# Patient Record
Sex: FEMALE | Race: BLACK OR AFRICAN AMERICAN | NOT HISPANIC OR LATINO | Employment: UNEMPLOYED | URBAN - METROPOLITAN AREA
[De-identification: names, ages, dates, MRNs, and addresses within clinical notes are randomized per-mention and may not be internally consistent; named-entity substitution may affect disease eponyms.]

---

## 2019-11-14 ENCOUNTER — HOSPITAL ENCOUNTER (OUTPATIENT)
Dept: RADIOLOGY | Facility: HOSPITAL | Age: 49
Discharge: HOME | End: 2019-11-14
Attending: STUDENT IN AN ORGANIZED HEALTH CARE EDUCATION/TRAINING PROGRAM
Payer: COMMERCIAL

## 2019-11-14 ENCOUNTER — OFFICE VISIT (OUTPATIENT)
Dept: RHEUMATOLOGY | Facility: CLINIC | Age: 49
End: 2019-11-14
Payer: COMMERCIAL

## 2019-11-14 ENCOUNTER — LAB VISIT (OUTPATIENT)
Dept: LAB | Facility: HOSPITAL | Age: 49
End: 2019-11-14
Attending: STUDENT IN AN ORGANIZED HEALTH CARE EDUCATION/TRAINING PROGRAM
Payer: COMMERCIAL

## 2019-11-14 VITALS — HEART RATE: 59 BPM | WEIGHT: 221.56 LBS | SYSTOLIC BLOOD PRESSURE: 127 MMHG | DIASTOLIC BLOOD PRESSURE: 83 MMHG

## 2019-11-14 DIAGNOSIS — M79.7 FIBROMYALGIA: ICD-10-CM

## 2019-11-14 DIAGNOSIS — M19.90 OSTEOARTHRITIS, UNSPECIFIED OSTEOARTHRITIS TYPE, UNSPECIFIED SITE: ICD-10-CM

## 2019-11-14 DIAGNOSIS — M25.50 ARTHRALGIA, UNSPECIFIED JOINT: ICD-10-CM

## 2019-11-14 DIAGNOSIS — R53.83 FATIGUE, UNSPECIFIED TYPE: ICD-10-CM

## 2019-11-14 DIAGNOSIS — M79.7 FIBROMYALGIA: Primary | ICD-10-CM

## 2019-11-14 LAB
25(OH)D3+25(OH)D2 SERPL-MCNC: 57 NG/ML (ref 30–96)
ALBUMIN SERPL BCP-MCNC: 4.1 G/DL (ref 3.5–5.2)
ALP SERPL-CCNC: 86 U/L (ref 55–135)
ALT SERPL W/O P-5'-P-CCNC: 8 U/L (ref 10–44)
ANION GAP SERPL CALC-SCNC: 11 MMOL/L (ref 8–16)
AST SERPL-CCNC: 14 U/L (ref 10–40)
BASOPHILS # BLD AUTO: 0.03 K/UL (ref 0–0.2)
BASOPHILS NFR BLD: 0.4 % (ref 0–1.9)
BILIRUB SERPL-MCNC: 0.9 MG/DL (ref 0.1–1)
BUN SERPL-MCNC: 8 MG/DL (ref 6–20)
CALCIUM SERPL-MCNC: 9.1 MG/DL (ref 8.7–10.5)
CCP AB SER IA-ACNC: <0.5 U/ML
CHLORIDE SERPL-SCNC: 105 MMOL/L (ref 95–110)
CO2 SERPL-SCNC: 25 MMOL/L (ref 23–29)
CREAT SERPL-MCNC: 0.8 MG/DL (ref 0.5–1.4)
CRP SERPL-MCNC: 22.4 MG/L (ref 0–8.2)
DIFFERENTIAL METHOD: ABNORMAL
EOSINOPHIL # BLD AUTO: 0 K/UL (ref 0–0.5)
EOSINOPHIL NFR BLD: 0.4 % (ref 0–8)
ERYTHROCYTE [DISTWIDTH] IN BLOOD BY AUTOMATED COUNT: 15 % (ref 11.5–14.5)
ERYTHROCYTE [SEDIMENTATION RATE] IN BLOOD BY WESTERGREN METHOD: 48 MM/HR (ref 0–36)
EST. GFR  (AFRICAN AMERICAN): >60 ML/MIN/1.73 M^2
EST. GFR  (NON AFRICAN AMERICAN): >60 ML/MIN/1.73 M^2
GLUCOSE SERPL-MCNC: 98 MG/DL (ref 70–110)
HCT VFR BLD AUTO: 37 % (ref 37–48.5)
HGB BLD-MCNC: 11.8 G/DL (ref 12–16)
IMM GRANULOCYTES # BLD AUTO: 0.02 K/UL (ref 0–0.04)
IMM GRANULOCYTES NFR BLD AUTO: 0.3 % (ref 0–0.5)
LYMPHOCYTES # BLD AUTO: 1.6 K/UL (ref 1–4.8)
LYMPHOCYTES NFR BLD: 22 % (ref 18–48)
MCH RBC QN AUTO: 27.9 PG (ref 27–31)
MCHC RBC AUTO-ENTMCNC: 31.9 G/DL (ref 32–36)
MCV RBC AUTO: 88 FL (ref 82–98)
MONOCYTES # BLD AUTO: 0.2 K/UL (ref 0.3–1)
MONOCYTES NFR BLD: 2.6 % (ref 4–15)
NEUTROPHILS # BLD AUTO: 5.3 K/UL (ref 1.8–7.7)
NEUTROPHILS NFR BLD: 74.3 % (ref 38–73)
NRBC BLD-RTO: 0 /100 WBC
PLATELET # BLD AUTO: 408 K/UL (ref 150–350)
PMV BLD AUTO: 11.3 FL (ref 9.2–12.9)
POTASSIUM SERPL-SCNC: 3.7 MMOL/L (ref 3.5–5.1)
PROT SERPL-MCNC: 8.2 G/DL (ref 6–8.4)
RBC # BLD AUTO: 4.23 M/UL (ref 4–5.4)
RHEUMATOID FACT SERPL-ACNC: <10 IU/ML (ref 0–15)
SODIUM SERPL-SCNC: 141 MMOL/L (ref 136–145)
WBC # BLD AUTO: 7.19 K/UL (ref 3.9–12.7)

## 2019-11-14 PROCEDURE — 73130 X-RAY EXAM OF HAND: CPT | Mod: 50,TC

## 2019-11-14 PROCEDURE — 85652 RBC SED RATE AUTOMATED: CPT

## 2019-11-14 PROCEDURE — 73130 X-RAY EXAM OF HAND: CPT | Mod: 26,LT,, | Performed by: RADIOLOGY

## 2019-11-14 PROCEDURE — 85025 COMPLETE CBC W/AUTO DIFF WBC: CPT

## 2019-11-14 PROCEDURE — 73130 X-RAY EXAM OF HAND: CPT | Mod: 26,RT,, | Performed by: RADIOLOGY

## 2019-11-14 PROCEDURE — 73562 X-RAY EXAM OF KNEE 3: CPT | Mod: 26,RT,, | Performed by: RADIOLOGY

## 2019-11-14 PROCEDURE — 86140 C-REACTIVE PROTEIN: CPT

## 2019-11-14 PROCEDURE — 73130 PR  X-RAY HAND 3+ VW: ICD-10-PCS | Mod: 26,LT,, | Performed by: RADIOLOGY

## 2019-11-14 PROCEDURE — 99999 PR PBB SHADOW E&M-NEW PATIENT-LVL IV: ICD-10-PCS | Mod: PBBFAC,,, | Performed by: STUDENT IN AN ORGANIZED HEALTH CARE EDUCATION/TRAINING PROGRAM

## 2019-11-14 PROCEDURE — 72200 XR SACROILIAC JOINTS 3 VIEWS: ICD-10-PCS | Mod: 26,,, | Performed by: RADIOLOGY

## 2019-11-14 PROCEDURE — 86038 ANTINUCLEAR ANTIBODIES: CPT

## 2019-11-14 PROCEDURE — 36415 COLL VENOUS BLD VENIPUNCTURE: CPT

## 2019-11-14 PROCEDURE — 72110 X-RAY EXAM L-2 SPINE 4/>VWS: CPT | Mod: TC

## 2019-11-14 PROCEDURE — 80053 COMPREHEN METABOLIC PANEL: CPT

## 2019-11-14 PROCEDURE — 82306 VITAMIN D 25 HYDROXY: CPT

## 2019-11-14 PROCEDURE — 73562 PR  X-RAY KNEE 3 VIEW: ICD-10-PCS | Mod: 26,LT,, | Performed by: RADIOLOGY

## 2019-11-14 PROCEDURE — 99204 OFFICE O/P NEW MOD 45 MIN: CPT | Mod: 25,S$GLB,ICN, | Performed by: STUDENT IN AN ORGANIZED HEALTH CARE EDUCATION/TRAINING PROGRAM

## 2019-11-14 PROCEDURE — 86200 CCP ANTIBODY: CPT

## 2019-11-14 PROCEDURE — 99204 OFFICE O/P NEW MOD 45 MIN: CPT | Mod: PBBFAC,25 | Performed by: STUDENT IN AN ORGANIZED HEALTH CARE EDUCATION/TRAINING PROGRAM

## 2019-11-14 PROCEDURE — 99204 PR OFFICE/OUTPT VISIT, NEW, LEVL IV, 45-59 MIN: ICD-10-PCS | Mod: 25,S$GLB,ICN, | Performed by: STUDENT IN AN ORGANIZED HEALTH CARE EDUCATION/TRAINING PROGRAM

## 2019-11-14 PROCEDURE — 99999 PR PBB SHADOW E&M-NEW PATIENT-LVL IV: CPT | Mod: PBBFAC,,, | Performed by: STUDENT IN AN ORGANIZED HEALTH CARE EDUCATION/TRAINING PROGRAM

## 2019-11-14 PROCEDURE — 72200 X-RAY EXAM SI JOINTS: CPT | Mod: TC

## 2019-11-14 PROCEDURE — 73562 X-RAY EXAM OF KNEE 3: CPT | Mod: 26,LT,, | Performed by: RADIOLOGY

## 2019-11-14 PROCEDURE — 20610 LARGE JOINT ASPIRATION/INJECTION: ICD-10-PCS | Mod: RT,S$GLB,ICN, | Performed by: STUDENT IN AN ORGANIZED HEALTH CARE EDUCATION/TRAINING PROGRAM

## 2019-11-14 PROCEDURE — 72110 X-RAY EXAM L-2 SPINE 4/>VWS: CPT | Mod: 26,,, | Performed by: RADIOLOGY

## 2019-11-14 PROCEDURE — 86431 RHEUMATOID FACTOR QUANT: CPT

## 2019-11-14 PROCEDURE — 72200 X-RAY EXAM SI JOINTS: CPT | Mod: 26,,, | Performed by: RADIOLOGY

## 2019-11-14 PROCEDURE — 20610 DRAIN/INJ JOINT/BURSA W/O US: CPT | Mod: PBBFAC | Performed by: STUDENT IN AN ORGANIZED HEALTH CARE EDUCATION/TRAINING PROGRAM

## 2019-11-14 PROCEDURE — 72110 XR LUMBAR SPINE 5 VIEW WITH FLEX AND EXT: ICD-10-PCS | Mod: 26,,, | Performed by: RADIOLOGY

## 2019-11-14 PROCEDURE — 73562 X-RAY EXAM OF KNEE 3: CPT | Mod: TC,50

## 2019-11-14 RX ORDER — TRIAMCINOLONE ACETONIDE 40 MG/ML
40 INJECTION, SUSPENSION INTRA-ARTICULAR; INTRAMUSCULAR
Status: DISCONTINUED | OUTPATIENT
Start: 2019-11-14 | End: 2019-11-14 | Stop reason: HOSPADM

## 2019-11-14 RX ORDER — ASPIRIN 325 MG
50000 TABLET, DELAYED RELEASE (ENTERIC COATED) ORAL
Refills: 0 | COMMUNITY
Start: 2019-10-28

## 2019-11-14 RX ORDER — GABAPENTIN 300 MG/1
300 CAPSULE ORAL NIGHTLY
Qty: 30 CAPSULE | Refills: 1 | Status: SHIPPED | OUTPATIENT
Start: 2019-11-14 | End: 2019-12-23 | Stop reason: SDUPTHER

## 2019-11-14 RX ORDER — FLUTICASONE PROPIONATE 50 MCG
SPRAY, SUSPENSION (ML) NASAL
Refills: 2 | COMMUNITY
Start: 2019-11-05

## 2019-11-14 RX ORDER — POTASSIUM CHLORIDE 1500 MG/1
20 TABLET, EXTENDED RELEASE ORAL DAILY
Refills: 0 | COMMUNITY
Start: 2019-10-03

## 2019-11-14 RX ORDER — MELOXICAM 7.5 MG/1
TABLET ORAL
Refills: 2 | COMMUNITY
Start: 2019-11-05

## 2019-11-14 RX ORDER — AMLODIPINE BESYLATE 10 MG/1
10 TABLET ORAL DAILY
Refills: 5 | COMMUNITY
Start: 2019-10-29

## 2019-11-14 RX ORDER — LOSARTAN POTASSIUM 25 MG/1
25 TABLET ORAL DAILY
Refills: 5 | COMMUNITY
Start: 2019-10-31

## 2019-11-14 RX ADMIN — TRIAMCINOLONE ACETONIDE 40 MG: 40 INJECTION, SUSPENSION INTRA-ARTICULAR; INTRAMUSCULAR at 08:11

## 2019-11-14 NOTE — PROGRESS NOTES
"     RHEUMATOLOGY OUTPATIENT CLINIC NOTE        Attending Rheumatologist: Chelsey Wisdom  Primary Care Provider: Rachelle Aleman MD   Physician Requesting Consultation: Aaareferral Self  No address on file  Chief Complaint/Reason For Consultation:  New Patient (joint pain)      Subjective:       HPI  Snow Matamoros is a 49 y.o. AAF who presents for evaluation of joint pain  Pt reports diffuse achiness and generalized pain. Reports significant fatigue. Not sleeping well. Does not feel refreshed when wakes up. States that if they over-do it in terms of physical activity, they feel like they are "paying for it the next day." Denies any significant stress in personal life. Not exercising. No hx gabapentin, amitriptyline, cymbalta, savella. Reports episodic swelling and stiffness in mcps bilaterally. No family hx SLE/RA. Main complaint is in bilateral knees constantly, worse kneeling. Pain is diffuse across knee -7/10. No buckling or falls. Also with chronic progressive lower back pain. +Significant AM stiffness. Denies significant improvement w nsaids. Sometimes improves w activity, sometimes improves w rest. No nighttime awakening. No hx pso, uveitis, IBD.    Pt denied any hairloss, vision changes, dysphagia, dry mouth, dry eyes, raynauds,  rash, muscle weakness, muscle pain, hoarseness, sob, weightloss, night sweats, chills, fevers, N/V/D, chest pain, frothy urine.    14pt ROS negative except as otherwise stated above    Review of Systems   Constitutional: Negative for chills and fever.   HENT: Negative for congestion and hearing loss.    Eyes: Negative for blurred vision and pain.   Respiratory: Negative for cough and sputum production.    Cardiovascular: Negative for chest pain and leg swelling.   Gastrointestinal: Negative for abdominal pain and heartburn.   Genitourinary: Negative for dysuria and hematuria.   Musculoskeletal: Positive for back pain, joint pain, myalgias and neck pain. Negative for falls. "   Skin: Negative for itching and rash.   Neurological: Negative for dizziness, sensory change, seizures and weakness.   Endo/Heme/Allergies: Negative for environmental allergies. Does not bruise/bleed easily.   Psychiatric/Behavioral: Negative for depression. The patient is not nervous/anxious and does not have insomnia.        Chronic comorbid conditions affecting medical decision making today:  No past medical history on file.  No past surgical history on file.  No family history on file.  Social History     Substance and Sexual Activity   Alcohol Use Not on file     Social History     Tobacco Use   Smoking Status Not on file     Social History     Substance and Sexual Activity   Drug Use Not on file       Current Outpatient Medications:     amLODIPine (NORVASC) 10 MG tablet, Take 10 mg by mouth once daily., Disp: , Rfl: 5    cholecalciferol, vitamin D3, 50,000 unit capsule, Take 50,000 Units by mouth every 7 days., Disp: , Rfl: 0    fluticasone propionate (FLONASE) 50 mcg/actuation nasal spray, INSTILL 2 SPRAYS IN EACH NARE DAILY, Disp: , Rfl: 2    losartan (COZAAR) 25 MG tablet, Take 25 mg by mouth once daily., Disp: , Rfl: 5    meloxicam (MOBIC) 7.5 MG tablet, TAKE 1 TABLET BY MOUTH DAILY. START WHEN KETOROLAC IS COMPLETE, Disp: , Rfl: 2    potassium chloride (K-TAB) 20 mEq, Take 20 mEq by mouth once daily., Disp: , Rfl: 0    gabapentin (NEURONTIN) 300 MG capsule, Take 1 capsule (300 mg total) by mouth every evening., Disp: 30 capsule, Rfl: 1       Objective:         Vitals:    11/14/19 0759   BP: 127/83   Pulse: (!) 59     Physical Exam   Nursing note and vitals reviewed.  Constitutional: She is oriented to person, place, and time and well-developed, well-nourished, and in no distress.   HENT:   Head: Normocephalic.   Mouth/Throat: Oropharynx is clear and moist.   Eyes: Conjunctivae are normal. Pupils are equal, round, and reactive to light.   Neck: Normal range of motion. Neck supple.   Cardiovascular:  Normal rate and normal heart sounds.    Pulmonary/Chest: Effort normal. No respiratory distress.   Abdominal: Soft. There is no tenderness.   Neurological: She is alert and oriented to person, place, and time.   Skin: Skin is warm. No rash noted. No erythema.     Psychiatric: Memory and affect normal.   Musculoskeletal:   No synovitis or effusion of small joints. No effusion over large joints.  -bilateral knee medial joint line tenderness           Reviewed old and all outside pertinent medical records available.    All lab results personally reviewed and interpreted by me.  No results found for: WBC, HGB, HCT, MCV, MCH, MCHC, RDW, PLT, MPV, NEUTROABS, LYMPHOABS, MONOABS, EOSINOABS, BASOSABS, NEUTROPCT, LYMPHOPCT, MONOPCT, EOSINOPCT, BASOPCT, DIFFTYPE, RBCMORPHOLOG, PLTEST    No results found for: NA, K, CL, CO2, GLU, BUN, LABCREA, CALCIUM, PROT, ALBUMIN, BILITOT, AST, ALKPHOS, ALT, GFRAA, GFRNONAA    No results found for: COLORU, APPEARANCEUA, SPECGRAV, PHUR, PROTEINUA, GLUCOSEU, KETONESU, BLOODU, LEUKOCYTESUR, NITRITE, UROBILINOGEN    No results found for: CRP    No results found for: SEDRATE, ERYTHROCYTES    No results found for: GRACE, RF, SEDRATE    No components found for: 25OHVITDTOT, 64FXNGUW6, 15SZWDOH2, METHODNOTE    No results found for: URICACID    No components found for: TSPOTTB      Imaging:    Large Joint Aspiration/Injection: R knee  Date/Time: 11/14/2019 8:00 AM  Performed by: Chelsey Wisdom MD  Authorized by: Chelsey Wisdom MD     Consent Done?:  Yes (Verbal)  Indications:  Pain  Procedure site marked: Yes    Timeout: Prior to procedure the correct patient, procedure, and site was verified      Location:  Knee  Site:  R knee  Prep: Patient was prepped and draped in usual sterile fashion    Needle size:  25 G  Ultrasonic Guidance for needle placement: No  Approach:  Anterolateral  Medications:  40 mg triamcinolone acetonide 40 mg/mL  Patient tolerance:  Patient tolerated the procedure well with  no immediate complications  Large Joint Aspiration/Injection  Date/Time: 11/14/2019 8:00 AM  Performed by: Chelsey Wisdom MD  Authorized by: Chelsey Wisdom MD     Consent Done?:  Yes (Verbal)  Indications:  Pain and joint swelling  Procedure site marked: Yes    Timeout: Prior to procedure the correct patient, procedure, and site was verified    Prep: Patient was prepped and draped in usual sterile fashion    Needle size:  25 G  Approach:  Anterolateral  Medications:  40 mg triamcinolone acetonide 40 mg/mL  Patient tolerance:  Patient tolerated the procedure well with no immediate complications         ASSESSMENT / PLAN:     Snow Matamoros is a 49 y.o. Black or  female with:      1. Polyarthralgias  -Presents for initial evaluation c/o pain in mcps, lower back, knees, and generalized pain  -Does have some features suggestive of possible inflammatory pattern, but mostly consistent w OA. Given severity of her symptoms, will complete workup as below for further evaluation and assess for evidence of inflamamtory arthritis    - X-Ray Knee 3 View Bilateral; Future  - X-Ray Hand Complete Bilateral; Future  - Comprehensive metabolic panel; Future  - CBC auto differential; Future  - Sedimentation rate; Future  - C-reactive protein; Future  - X-Ray Sacroiliac Joints 3 Views; Future  - X-Ray Lumbar Complete With Flex And Ext; Future  - Rheumatoid factor; Future  - Cyclic citrul peptide antibody, IgG; Future  - GRACE Screen w/Reflex; Future  - Vitamin D; Future    2. Fibromyalgia  -+Diffuse achiness/pain, +fatigue  -Discussed importance of proper sleep hygiene and exercise. Recommend low impact aerobics.  -Gabapentin qhs    3. Knee pain  -2.2 OA / PFO  -Discussed quad strengthening exercises and weight loss  -Tylenol extra strength arthritis prn / ice prn  -R/L knees injected today w lidocaine/kenalog        Follow up in about 4 weeks (around 12/12/2019).    Method of contact with patient concerns: [facility]      Chelsey Wisdom M.D.  Rheumatology Department   [facility] [place] [address], [place] [address]  Phone: [phone]  Fax: [phone]

## 2019-11-15 LAB — ANA SER QL IF: NORMAL

## 2019-12-07 RX ORDER — GABAPENTIN 300 MG/1
CAPSULE ORAL
Qty: 30 CAPSULE | Refills: 1 | OUTPATIENT
Start: 2019-12-07

## 2019-12-18 ENCOUNTER — HOSPITAL ENCOUNTER (OUTPATIENT)
Dept: RADIOLOGY | Facility: HOSPITAL | Age: 49
Discharge: HOME | End: 2019-12-18
Attending: STUDENT IN AN ORGANIZED HEALTH CARE EDUCATION/TRAINING PROGRAM
Payer: COMMERCIAL

## 2019-12-18 ENCOUNTER — OFFICE VISIT (OUTPATIENT)
Dept: RHEUMATOLOGY | Facility: CLINIC | Age: 49
End: 2019-12-18
Payer: COMMERCIAL

## 2019-12-18 VITALS
WEIGHT: 219.38 LBS | SYSTOLIC BLOOD PRESSURE: 149 MMHG | DIASTOLIC BLOOD PRESSURE: 92 MMHG | HEART RATE: 57 BPM | BODY MASS INDEX: 41.42 KG/M2 | HEIGHT: 61 IN

## 2019-12-18 DIAGNOSIS — M19.90 OSTEOARTHRITIS, UNSPECIFIED OSTEOARTHRITIS TYPE, UNSPECIFIED SITE: Primary | ICD-10-CM

## 2019-12-18 DIAGNOSIS — M79.669 CALF PAIN, UNSPECIFIED LATERALITY: ICD-10-CM

## 2019-12-18 DIAGNOSIS — M79.7 FIBROMYALGIA: ICD-10-CM

## 2019-12-18 DIAGNOSIS — M19.90 OSTEOARTHRITIS, UNSPECIFIED OSTEOARTHRITIS TYPE, UNSPECIFIED SITE: ICD-10-CM

## 2019-12-18 DIAGNOSIS — M22.2X9 PATELLOFEMORAL STRESS SYNDROME, UNSPECIFIED LATERALITY: ICD-10-CM

## 2019-12-18 PROCEDURE — 99214 OFFICE O/P EST MOD 30 MIN: CPT | Mod: S$GLB,,, | Performed by: STUDENT IN AN ORGANIZED HEALTH CARE EDUCATION/TRAINING PROGRAM

## 2019-12-18 PROCEDURE — 3008F PR BODY MASS INDEX (BMI) DOCUMENTED: ICD-10-PCS | Mod: CPTII,S$GLB,, | Performed by: STUDENT IN AN ORGANIZED HEALTH CARE EDUCATION/TRAINING PROGRAM

## 2019-12-18 PROCEDURE — 99214 PR OFFICE/OUTPT VISIT, EST, LEVL IV, 30-39 MIN: ICD-10-PCS | Mod: S$GLB,,, | Performed by: STUDENT IN AN ORGANIZED HEALTH CARE EDUCATION/TRAINING PROGRAM

## 2019-12-18 PROCEDURE — 93970 EXTREMITY STUDY: CPT | Mod: 26,,, | Performed by: RADIOLOGY

## 2019-12-18 PROCEDURE — 93970 US LOWER EXTREMITY VEINS BILATERAL: ICD-10-PCS | Mod: 26,,, | Performed by: RADIOLOGY

## 2019-12-18 PROCEDURE — 99999 PR PBB SHADOW E&M-EST. PATIENT-LVL IV: CPT | Mod: PBBFAC,,, | Performed by: STUDENT IN AN ORGANIZED HEALTH CARE EDUCATION/TRAINING PROGRAM

## 2019-12-18 PROCEDURE — 3008F BODY MASS INDEX DOCD: CPT | Mod: CPTII,S$GLB,, | Performed by: STUDENT IN AN ORGANIZED HEALTH CARE EDUCATION/TRAINING PROGRAM

## 2019-12-18 PROCEDURE — 99999 PR PBB SHADOW E&M-EST. PATIENT-LVL IV: ICD-10-PCS | Mod: PBBFAC,,, | Performed by: STUDENT IN AN ORGANIZED HEALTH CARE EDUCATION/TRAINING PROGRAM

## 2019-12-18 PROCEDURE — 93970 EXTREMITY STUDY: CPT | Mod: TC

## 2019-12-18 NOTE — PROGRESS NOTES
"     RHEUMATOLOGY OUTPATIENT CLINIC NOTE        Attending Rheumatologist: Chelsey Wisdom  Primary Care Provider: Rachelle Aleman MD (Inactive)   Physician Requesting Consultation: No referring provider defined for this encounter.  Chief Complaint/Reason For Consultation:  Knee pain    Subjective:       SELENA Matamoros is a 49 y.o. AAF who presents for evaluation of joint pain  Pt reports diffuse achiness and generalized pain. Reports significant fatigue. Not sleeping well. Does not feel refreshed when wakes up. States that if they over-do it in terms of physical activity, they feel like they are "paying for it the next day." Denies any significant stress in personal life. Not exercising. No hx gabapentin, amitriptyline, cymbalta, savella. Reports episodic swelling and stiffness in mcps bilaterally. No family hx SLE/RA. Main complaint is in bilateral knees constantly, worse kneeling. Pain is diffuse across knee -7/10. No buckling or falls. Also with chronic progressive lower back pain. +Significant AM stiffness. Denies significant improvement w nsaids. Sometimes improves w activity, sometimes improves w rest. No nighttime awakening. No hx pso, uveitis, IBD.    Today:  Pt reports worsening calf pain x several weeks. No sob or chest pain.     Pt denied any hairloss, vision changes, dysphagia, dry mouth, dry eyes, raynauds,  rash, muscle weakness, muscle pain, hoarseness, sob, weightloss, night sweats, chills, fevers, N/V/D, chest pain, frothy urine.    14pt ROS negative except as otherwise stated above    Review of Systems   Constitutional: Negative for chills and fever.   HENT: Negative for congestion and hearing loss.    Eyes: Negative for blurred vision and pain.   Respiratory: Negative for cough and sputum production.    Cardiovascular: Negative for chest pain and leg swelling.   Gastrointestinal: Negative for abdominal pain and heartburn.   Genitourinary: Negative for dysuria and hematuria. "   Musculoskeletal: Positive for back pain, joint pain, myalgias and neck pain. Negative for falls.   Skin: Negative for itching and rash.   Neurological: Negative for dizziness, sensory change, seizures and weakness.   Endo/Heme/Allergies: Negative for environmental allergies. Does not bruise/bleed easily.   Psychiatric/Behavioral: Negative for depression. The patient is not nervous/anxious and does not have insomnia.        Chronic comorbid conditions affecting medical decision making today:  Past Medical History:   Diagnosis Date    Hypertension      Past Surgical History:   Procedure Laterality Date    TUBAL LIGATION       Family History   Family history unknown: Yes     Social History     Substance and Sexual Activity   Alcohol Use Yes    Comment: Occasiona     Social History     Tobacco Use   Smoking Status Never Smoker   Smokeless Tobacco Never Used     Social History     Substance and Sexual Activity   Drug Use Never       Current Outpatient Medications:     amLODIPine (NORVASC) 10 MG tablet, Take 10 mg by mouth once daily., Disp: , Rfl: 5    cholecalciferol, vitamin D3, 50,000 unit capsule, Take 50,000 Units by mouth every 7 days., Disp: , Rfl: 0    fluticasone propionate (FLONASE) 50 mcg/actuation nasal spray, INSTILL 2 SPRAYS IN EACH NARE DAILY, Disp: , Rfl: 2    gabapentin (NEURONTIN) 300 MG capsule, Take 1 capsule (300 mg total) by mouth every evening., Disp: 30 capsule, Rfl: 1    losartan (COZAAR) 25 MG tablet, Take 25 mg by mouth once daily., Disp: , Rfl: 5    meloxicam (MOBIC) 7.5 MG tablet, TAKE 1 TABLET BY MOUTH DAILY. START WHEN KETOROLAC IS COMPLETE, Disp: , Rfl: 2    potassium chloride (K-TAB) 20 mEq, Take 20 mEq by mouth once daily., Disp: , Rfl: 0       Objective:         Vitals:    12/18/19 0827   BP: (!) 149/92   Pulse: (!) 57     Physical Exam   Nursing note and vitals reviewed.  Constitutional: She is oriented to person, place, and time and well-developed, well-nourished, and in no  distress.   HENT:   Head: Normocephalic.   Mouth/Throat: Oropharynx is clear and moist.   Eyes: Conjunctivae are normal. Pupils are equal, round, and reactive to light.   Neck: Normal range of motion. Neck supple.   Cardiovascular: Normal rate and normal heart sounds.    Pulmonary/Chest: Effort normal. No respiratory distress.   Abdominal: Soft. There is no tenderness.   Neurological: She is alert and oriented to person, place, and time.   Skin: Skin is warm. No rash noted. No erythema.     Psychiatric: Memory and affect normal.   Musculoskeletal:   No synovitis or effusion of small joints. No effusion over large joints.  -bilateral knee medial joint line tenderness           Reviewed old and all outside pertinent medical records available.    All lab results personally reviewed and interpreted by me.  Lab Results   Component Value Date    WBC 7.19 11/14/2019    HGB 11.8 (L) 11/14/2019    HCT 37.0 11/14/2019    MCV 88 11/14/2019    MCH 27.9 11/14/2019    MCHC 31.9 (L) 11/14/2019    RDW 15.0 (H) 11/14/2019     (H) 11/14/2019    MPV 11.3 11/14/2019       Lab Results   Component Value Date     11/14/2019    K 3.7 11/14/2019     11/14/2019    CO2 25 11/14/2019    GLU 98 11/14/2019    BUN 8 11/14/2019    CALCIUM 9.1 11/14/2019    PROT 8.2 11/14/2019    ALBUMIN 4.1 11/14/2019    BILITOT 0.9 11/14/2019    AST 14 11/14/2019    ALKPHOS 86 11/14/2019    ALT 8 (L) 11/14/2019       No results found for: COLORU, APPEARANCEUA, SPECGRAV, PHUR, PROTEINUA, GLUCOSEU, KETONESU, BLOODU, LEUKOCYTESUR, NITRITE, UROBILINOGEN    Lab Results   Component Value Date    CRP 22.4 (H) 11/14/2019       Lab Results   Component Value Date    SEDRATE 48 (H) 11/14/2019       Lab Results   Component Value Date    RF <10.0 11/14/2019    SEDRATE 48 (H) 11/14/2019       No components found for: 25OHVITDTOT, 21MOBNHN4, 48MFFIOY5, METHODNOTE    No results found for: URICACID    No components found for:  TSPOTTB      Imaging:    Procedures     ASSESSMENT / PLAN:     Snow Matamoros is a 49 y.o. Black or  female with:      #OA  -Tylenol extra strength arthritis prn / topicals prn / ice prn     #Fibromyalgia  -+Diffuse achiness/pain, +fatigue  -Discussed importance of proper sleep hygiene and exercise. Recommend low impact aerobics.  -Gabapentin qhs    # Knee pain  -2.2 OA / PFS  -Discussed quad strengthening exercises and weight loss  -Tylenol extra strength arthritis prn / ice prn  -R/L knees injected previous visit w steroids.   -Will start PA for synvisc at rtc    #Calf pain  -LE US r/o dvt    Time spent: 45 minutes in face to face discussion concerning diagnosis, prognosis, review of lab and test results, benefits of treatment as well as management of disease, counseling of patient and coordination of care between various health care providers . Greater than half the time spent was used for coordination of care and counseling of patient.    No follow-ups on file.    Method of contact with patient concerns: [facility]      Chelsey Wisdom M.D.  Rheumatology Department   [facility] [place] [address], [place] [address]  Phone: [phone]  Fax: [phone]

## 2019-12-24 ENCOUNTER — CLINICAL SUPPORT (OUTPATIENT)
Dept: REHABILITATION | Facility: HOSPITAL | Age: 49
End: 2019-12-24
Attending: STUDENT IN AN ORGANIZED HEALTH CARE EDUCATION/TRAINING PROGRAM
Payer: COMMERCIAL

## 2019-12-24 DIAGNOSIS — M15.9 PRIMARY OSTEOARTHRITIS INVOLVING MULTIPLE JOINTS: Primary | ICD-10-CM

## 2019-12-24 PROCEDURE — 97161 PT EVAL LOW COMPLEX 20 MIN: CPT

## 2019-12-24 PROCEDURE — 97110 THERAPEUTIC EXERCISES: CPT

## 2019-12-24 RX ORDER — GABAPENTIN 300 MG/1
300 CAPSULE ORAL NIGHTLY
Qty: 30 CAPSULE | Refills: 1 | Status: SHIPPED | OUTPATIENT
Start: 2019-12-24 | End: 2020-02-10 | Stop reason: SDUPTHER

## 2019-12-24 NOTE — PROGRESS NOTES
[facility]  Physical Therapy Initial Evaluation    Name: Snow Matamoros  Clinic Number: [id number]    Therapy Diagnosis: No diagnosis found.  Physician: Chelsey Wisdom MD    Physician Orders: PT Eval and Treat   Medical Diagnosis from Referral: M19.90 (ICD-10-CM) - Osteoarthritis, unspecified osteoarthritis type, unspecified site  Evaluation Date: 2019  Authorization Period Expiration: 19  Plan of Care Expiration: 19  Visit # / Visits authorized: 1/15    Time In: 9:00  Time Out: 10:15  Total Billable Time: 8 minutes    Precautions: Standard    Subjective   Date of onset: Years  History of current condition - Snow reports: Pt. Reports that she has had pain for years and the last 2 years her pain has gotten worse.  Pt. Reports feeling stiffness in her low back with sitting too longer of lifting.  Pt. Reports that her pain begins in L thoracic paraspinals and radiates down and across low back.  Pt. Reports lots crepitus in knees greater in R knee.  Pt. Reports tenderness in R lateral knee.  Pt. Reports that her knees get inflamed after lots of activity.       Pain:  Low Back and Knee Pain:   Current 6/10, worst 10/10, best 0/10   Easing Factors: lying down    Prior Therapy: None  Social History:  lives with their spouse  Occupation: Unemployed  Prior Level of Function: Independent with pain  Current Level of Function: walks slow with knee pain, getting out of car    Imagin19 FINDINGS:  Mild to moderate medial compartment joint space narrowing is seen on either side.  Superior patellar enthesophytes are present.  No joint effusion.  No fracture or dislocation    19  FINDINGS:  Mild rightward curvature of the lumbar spine.  No acute fracture or listhesis.  Mild spurring of the vertebral body endplates.  Vertebral body heights and disc spaces are maintained.  Mild lower lumbar facet arthropathy.  No significant instability on flexion/extension maneuvers  is seen.    Medical History:   Past Medical History:   Diagnosis Date    Hypertension        Surgical History:   Snow Matamoros  has a past surgical history that includes Tubal ligation.    Medications:   Snow has a current medication list which includes the following prescription(s): amlodipine, cholecalciferol (vitamin d3), fluticasone propionate, gabapentin, losartan, meloxicam, and potassium chloride.    Allergies:   Review of patient's allergies indicates:   Allergen Reactions    Macrobid [nitrofurantoin monohyd/m-cryst] Rash        Pts goals: Pt. Wants to decrease pain to work out at gym    Objective       CMS Impairment/Limitation/Restriction for FOTO Knee Survey    Therapist reviewed FOTO scores for Snow Matamoros on 12/24/2019.   FOTO documents entered into [email or web address] - see Media section.    Limitation Score: 46%  Category: Mobility    Current : CK = at least 40% but < 60% impaired, limited or restricted  Goal: CJ = at least 20% but < 40% impaired, limited or restricted  Discharge:      Posture/Structure:  Mild trunk flexion     Cervical AROM : WNL but pain R cervical region    UE AROM: B Shoulders WNL    B shoulder strength: 4+/5 except 4/5 B ext. Rot, and 3+/5 B int. rotators    Gait:    Neuro/Sensation:    Reflexes:    Sensation: Decreased sensation L UE, B LE intact    Squat:   Double leg:  shift weight L, increased hip adduction on L        Single leg: NT    SLS R:L 14: 17 sec.    L/sp AROM:   Degrees Pain Present (Y/N)   FB 90 pain   RSB WNL Less pain   LSB WNL Pain in L Low back   BB WNL pain   RRot WNL    LRot WNL                  R L  Strength:  Hip flexors  5/5 5/5  Quadriceps  5/5 5/5      Hamstrings  5/5 5/5     Anterior Tibialis 5/5 5/5     Peroneals  5/5 5/5     Gastrocnemius 4/5 4/5     Adductors  1+/5 1+/5     External rotators NT/5 NT/5     Gluteus Medius 4+/5 4+/5     Gluteus Eliazar 1+/5 1+/5     Great toe extension NT/5 NT/5     Plank time  NTsec     Side plank  time NTsec     Eufemia  NTsec     Post. Tibialis  5/5 3+/5    Special Test:  Slump Test:  NT      SLR Test:  Mild + bilat          PIVM Lumbar: discomfort throughout lumbar spine    Thoracic Mobility: NT      Palpation: no tenderness in L thoracic paraspinals    Neural Tension Test: mild + B sciatic nerve          TREATMENT   Treatment Time In: 9:00  Treatment Time Out: 10:15  Total Treatment time separate from Evaluation: 8 minutes    Snow received therapeutic exercises to develop strength, flexibility, posture and core stabilization for 8 minutes including: PPT x 2 min., bridging x 2 min., hip add x 2min., L sciatic nerve glide x 2min.    Snow received the following manual therapy techniques: L QL stretch x 2min.      Snow received the following supervised modalities after being cleared for contradictions: TENS:  Snow received TENS electrical stimulation for pain to the lumbar. Pt received continuous mode for 15 minutes. Snow tolerated treatment well without any adverse effects.     Snow received hot pack for 15 minutes to lumbar region.        Home Exercises and Patient Education Provided    Education provided:   -Education on condition, HEP, and activity    Written Home Exercises Provided: yes.  Exercises were reviewed and Snow was able to demonstrate them prior to the end of the session.  Snow demonstrated good  understanding of the education provided.         Assessment   Snow is a 49 y.o. female referred to outpatient Physical Therapy with a medical diagnosis of osteoarthritis unspecified. Pt presents with pain with lumbar spine movements, mild to significant weakness in LEs, + B sciatic neural bias, Mild L + femoral nerve bias,  Moderate weakness B shoulder external rotators.    Pt prognosis is Excellent.   Pt will benefit from skilled outpatient Physical Therapy to address the deficits stated above and in the chart below, provide pt/family education, and to maximize pt's  level of independence.     Plan of care discussed with patient: Yes  Pt's spiritual, cultural and educational needs considered and patient is agreeable to the plan of care and goals as stated below:     Anticipated Barriers for therapy: none    Medical Necessity is demonstrated by the following  History  Co-morbidities and personal factors that may impact the plan of care Co-morbidities:   none    Personal Factors:   no deficits     low   Examination  Body Structures and Functions, activity limitations and participation restrictions that may impact the plan of care Body Regions:   neck  back  lower extremities  upper extremities  trunk    Body Systems:    strength  gross coordinated movement  balance  gait  transfers  transitions  motor control  motor learning    Participation Restrictions:   Limited with recreational activities, lifting, walking, sitting long periods of time    Activity limitations:   Learning and applying knowledge  no deficits    General Tasks and Commands  no deficits    Communication  no deficits    Mobility  lifting and carrying objects  walking    Self care  no deficits    Domestic Life  no deficits    Interactions/Relationships  no deficits    Life Areas  no deficits    Community and Social Life  community life  recreation and leisure         moderate   Clinical Presentation stable and uncomplicated low   Decision Making/ Complexity Score: low     Goals:  Short Term Goals: In 3-4 weeks   1.I with HEP  2.Pt to perform lumbar ROM with pain less than 4/10 consistently.  3. Pt to increase BLE hip strength to 3+/5 or greater.  4.Pt to have knee pain less than 4/10 at all times.      Long Term Goals: In 6-8 weeks  1.Pt to have 5/5 gross UE strength to tolerate lifting and reaching without discomfort.  2. Patient to demo increase in LE strength to 5/5 gross MMT to tolerate standing and walking activities.  3. Patient to have decreased pain to less than 2/10 at all times.  4. Patient to have neg. B  sciatic nerve bias to tolerate sitting and standing activities.      Plan   Plan of care Certification: 12/24/2019 to 1/24/20.    Outpatient Physical Therapy 2 times weekly for 8 weeks to include the following interventions: Gait Training, Manual Therapy, Moist Heat/ Ice, Neuromuscular Re-ed, Patient Education, Self Care, Therapeutic Activites and Therapeutic Exercise, e-stim.    Flor Sow, PT    Thank you for this referral.    These services are reasonable and necessary for the conditions set forth above while under my care.

## 2019-12-26 ENCOUNTER — CLINICAL SUPPORT (OUTPATIENT)
Dept: REHABILITATION | Facility: HOSPITAL | Age: 49
End: 2019-12-26
Payer: COMMERCIAL

## 2019-12-26 DIAGNOSIS — M15.9 PRIMARY OSTEOARTHRITIS INVOLVING MULTIPLE JOINTS: Primary | ICD-10-CM

## 2019-12-26 PROCEDURE — 97140 MANUAL THERAPY 1/> REGIONS: CPT

## 2019-12-26 PROCEDURE — 97110 THERAPEUTIC EXERCISES: CPT

## 2019-12-26 PROCEDURE — 97014 ELECTRIC STIMULATION THERAPY: CPT

## 2019-12-26 NOTE — PROGRESS NOTES
Physical Therapy Daily Treatment Note     Name: Snow Matamoros  Clinic Number: [id number]    Therapy Diagnosis:   Encounter Diagnosis   Name Primary?    Primary osteoarthritis involving multiple joints Yes     Physician: Chelsey Wisdom MD    Visit Date: 12/26/2019    Physician Orders: PT Eval and Treat  Medical Diagnosis: : M19.90 (ICD-10-CM) - Osteoarthritis, unspecified osteoarthritis type, unspecified site  Evaluation Date: 12/24/19  Authorization Period Expiration: 1/5/21  Plan of Care Certification Period: 1/26/20  Visit #/Visits authorized: 2/20     Time In: 4:00  Time Out: 5:15  Total Billable Time: 26 minutes    Precautions: Standard    Subjective     Pt reports: cont. Discomfort in lumbar region and B LEs.  She was compliant with home exercise program.  Response to previous treatment: none noted yet  Functional change: none noted yet    Pain: not reported today    Objective   Snow received therapeutic exercises to develop strength, flexibility, posture and core stabilization for 12 minutes including: PPT x 2 min., bridging x 2 min., hip add x 2min., L sciatic nerve glide x 2min., B ext. Rot x 2 min., heel raises x 2 min., B femoral nerve glide x 2 min.     Snow received the following manual therapy techniques x 14 min.: P-A mob L5 spinous processes x 2 min., B sciatic neural mob x 4 min., L femoral nerve bias x 4 min., B facet gapping x 4 min.         Snow received the following supervised modalities after being cleared for contradictions: TENS:  Snow received TENS electrical stimulation for pain to the lumbar. Pt received continuous mode for 15 minutes. Snow tolerated treatment well without any adverse effects.      Snow received hot pack for 15 minutes to lumbar region.            Home Exercises Provided and Patient Education Provided     Education provided:   - condition, activity    Written Home Exercises Provided: Patient instructed to cont prior HEP.  Exercises were  reviewed and Snow was able to demonstrate them prior to the end of the session.  Snow demonstrated good  understanding of the education provided.         Assessment   Pt. Had mild tenderness L4-5 spinous processes.  Pt. Had tenderness in L peroneal region along peroneal nerve- neural mobilization performed.  Pt. Noted tension with B femoral nerves glides today and educated on need to perform glides on B LE. Began ext. Rotator shoulder strengthening secondary moderate weakness noted on eval.  Cont. POC to decrease pain and improve UE, LE, and lumbar mobility.    Snow is progressing towards her goals.   Pt prognosis is Excellent.     Pt will continue to benefit from skilled outpatient physical therapy to address the deficits listed in the problem list box on initial evaluation, provide pt/family education and to maximize pt's level of independence in the home and community environment.     Pt's spiritual, cultural and educational needs considered and pt agreeable to plan of care and goals.     Anticipated barriers to physical therapy: none    Goals:  Short Term Goals: In 3-4 weeks   1.I with HEP  2.Pt to perform lumbar ROM with pain less than 4/10 consistently.  3. Pt to increase BLE hip strength to 3+/5 or greater.  4.Pt to have knee pain less than 4/10 at all times.        Long Term Goals: In 6-8 weeks  1.Pt to have 5/5 gross UE strength to tolerate lifting and reaching without discomfort.  2. Patient to demo increase in LE strength to 5/5 gross MMT to tolerate standing and walking activities.  3. Patient to have decreased pain to less than 2/10 at all times.  4. Patient to have neg. B sciatic nerve bias to tolerate sitting and standing activities.          Plan     Plan of care Certification: 12/24/2019 to 1/24/20.     Outpatient Physical Therapy 2 times weekly for 8 weeks to include the following interventions: Gait Training, Manual Therapy, Moist Heat/ Ice, Neuromuscular Re-ed, Patient Education, Self  Care, Therapeutic Activites and Therapeutic Exercise, e-stim.       Thank you for this referral.     These services are reasonable and necessary for the conditions set forth above while under my care.    Flor Sow, PT

## 2019-12-30 ENCOUNTER — CLINICAL SUPPORT (OUTPATIENT)
Dept: REHABILITATION | Facility: HOSPITAL | Age: 49
End: 2019-12-30
Payer: COMMERCIAL

## 2019-12-30 DIAGNOSIS — M15.9 PRIMARY OSTEOARTHRITIS INVOLVING MULTIPLE JOINTS: Primary | ICD-10-CM

## 2019-12-30 PROCEDURE — 97110 THERAPEUTIC EXERCISES: CPT

## 2019-12-30 PROCEDURE — 97014 ELECTRIC STIMULATION THERAPY: CPT

## 2019-12-30 PROCEDURE — 97140 MANUAL THERAPY 1/> REGIONS: CPT

## 2020-01-02 ENCOUNTER — CLINICAL SUPPORT (OUTPATIENT)
Dept: REHABILITATION | Facility: HOSPITAL | Age: 50
End: 2020-01-02
Payer: COMMERCIAL

## 2020-01-02 DIAGNOSIS — R29.898 BILATERAL LEG WEAKNESS: ICD-10-CM

## 2020-01-02 DIAGNOSIS — M15.9 PRIMARY OSTEOARTHRITIS INVOLVING MULTIPLE JOINTS: Primary | ICD-10-CM

## 2020-01-02 PROCEDURE — 97014 ELECTRIC STIMULATION THERAPY: CPT

## 2020-01-02 PROCEDURE — 97110 THERAPEUTIC EXERCISES: CPT

## 2020-01-02 PROCEDURE — 97140 MANUAL THERAPY 1/> REGIONS: CPT

## 2020-01-02 NOTE — PROGRESS NOTES
Physical Therapy Daily Treatment Note     Name: Snow Matamoros  Clinic Number: [id number]    Therapy Diagnosis:   Encounter Diagnosis   Name Primary?    Primary osteoarthritis involving multiple joints Yes     Physician: Chelsey Widsom MD    Visit Date: 1/2/2020    Physician Orders: PT Eval and Treat  Medical Diagnosis: : M19.90 (ICD-10-CM) - Osteoarthritis, unspecified osteoarthritis type, unspecified site  Evaluation Date: 12/24/19  Authorization Period Expiration: 1/5/21  Plan of Care Certification Period: 1/26/20  Visit #/Visits authorized: 4/20     Time In: 8:25  Time Out: 9:15  Total Billable Time:  minutes    Precautions: Standard    Subjective   Pt. Reports that she had increased pain with dancing 2 line dances [employer].    Pt reports: She was compliant with home exercise program.  Response to previous treatment: none noted yet  Functional change: none noted yet    Neck Pain: 2/10 with R SB   Low Back and R hip: 6-7/10       Objective     Snow received therapeutic exercises to develop strength, flexibility, posture and core stabilization for 10 minutes including: bridging x 2 min.,  L ext. Rot (clams) x 2 min., standing hip abd  WB on L LE x 2 min., R hike x 2 min., heel raises x 2min.    Snow received the following manual therapy techniques x 14 min.:  B LAD x 2 min., B tibia int and ext. Rot mob x 4 min., B tibiofemoral distraction mob x 4 min., B P-A hip mob x 4 min.           Snow received the following supervised modalities after being cleared for contradictions: TENS:  Snow received TENS electrical stimulation for pain to the lumbar, L hip. Pt received continuous mode for 15 minutes. Snow tolerated treatment well without any adverse effects.      Snow received hot pack for 15 minutes to lumbar region.            Home Exercises Provided and Patient Education Provided     Education provided:   - condition, activity    Written Home Exercises Provided: Patient instructed  to cont prior HEP.  Exercises were reviewed and Snow was able to demonstrate them prior to the end of the session.  Snow demonstrated good  understanding of the education provided.         Assessment   No  Restrictions along cervical vertebrae.  PT. Reports pain L shoulder with PROM with 'popping'.  Pt. Reports 'popping' with R hip PROM, and pain in L hip.  Pt. Reports most pain in L hip today.  Pt. Had no pain with B knee flexion passively.  Will begin prolonged tendon holding to L gluteal musculature to help decrease pain.  Pt. Late to PT limiting treatment.    Snow is progressing towards her goals.   Pt prognosis is Excellent.     Pt will continue to benefit from skilled outpatient physical therapy to address the deficits listed in the problem list box on initial evaluation, provide pt/family education and to maximize pt's level of independence in the home and community environment.     Pt's spiritual, cultural and educational needs considered and pt agreeable to plan of care and goals.     Anticipated barriers to physical therapy: none    Goals:  Short Term Goals: In 3-4 weeks   1.I with HEP  2.Pt to perform lumbar ROM with pain less than 4/10 consistently.  3. Pt to increase BLE hip strength to 3+/5 or greater.  4.Pt to have knee pain less than 4/10 at all times.        Long Term Goals: In 6-8 weeks  1.Pt to have 5/5 gross UE strength to tolerate lifting and reaching without discomfort.  2. Patient to demo increase in LE strength to 5/5 gross MMT to tolerate standing and walking activities.  3. Patient to have decreased pain to less than 2/10 at all times.  4. Patient to have neg. B sciatic nerve bias to tolerate sitting and standing activities.          Plan     Plan of care Certification: 12/24/2019 to 1/24/20.     Outpatient Physical Therapy 2 times weekly for 8 weeks to include the following interventions: Gait Training, Manual Therapy, Moist Heat/ Ice, Neuromuscular Re-ed, Patient Education,  Self Care, Therapeutic Activites and Therapeutic Exercise, e-stim.       Thank you for this referral.     These services are reasonable and necessary for the conditions set forth above while under my care.    Flor Sow, PT

## 2020-01-07 ENCOUNTER — CLINICAL SUPPORT (OUTPATIENT)
Dept: REHABILITATION | Facility: HOSPITAL | Age: 50
End: 2020-01-07
Payer: COMMERCIAL

## 2020-01-07 DIAGNOSIS — R29.898 BILATERAL LEG WEAKNESS: Primary | ICD-10-CM

## 2020-01-07 PROCEDURE — 97140 MANUAL THERAPY 1/> REGIONS: CPT

## 2020-01-07 PROCEDURE — 97014 ELECTRIC STIMULATION THERAPY: CPT

## 2020-01-07 PROCEDURE — 97110 THERAPEUTIC EXERCISES: CPT

## 2020-01-07 NOTE — PROGRESS NOTES
Physical Therapy Daily Treatment Note     Name: Snow Matamoros  Clinic Number: [id number]    Therapy Diagnosis:   Encounter Diagnosis   Name Primary?    Bilateral leg weakness Yes     Physician: Chelsey Wisdom MD    Visit Date: 1/7/2020    Physician Orders: PT Eval and Treat  Medical Diagnosis: : M19.90 (ICD-10-CM) - Osteoarthritis, unspecified osteoarthritis type, unspecified site  Evaluation Date: 12/24/19  Authorization Period Expiration: 1/5/21  Plan of Care Certification Period: 1/26/20  Visit #/Visits authorized: 5/20     Time In: 8:00  Time Out: 9:15  Total Billable Time:  minutes    Precautions: Standard    Subjective   Pt. Reports having knee pain and low back pain and general stiffness all over.  Pt.. Reports cont. Tenderness to touch at L lateral leg.    Pt reports: She was compliant with home exercise program.  Response to previous treatment: none noted yet  Functional change: none noted yet    Neck Pain: 2-3/10 with B cervical paraspinals, tight,  Low Back and L hip: 6/10      Objective     Snow received therapeutic exercises to develop strength, flexibility, posture and core stabilization for 28 minutes including: recumbent bike x 5 min., UBE x 3 min., bridging x 2 min.,  iso abs x 2min., B shoulder ext. Rot x 4 min.,  heel raises x 2min., mini squats x 2 min., hip 4 way x 8 min.    Snow received the following manual therapy techniques x 13 min.:  B LAD x 2 min., B tibia int and ext. Rot mob x 2 min., B tibiofemoral distraction mob x 4 min., L neural mobilization of peroneal nerve x 5 min.           Snow received the following supervised modalities after being cleared for contradictions: TENS:  Snow received TENS electrical stimulation for pain to the lumbar, L hip. Pt received continuous mode for 15 minutes. Snow tolerated treatment well without any adverse effects.      Snow received hot pack for 15 minutes to lumbar region.            Home Exercises Provided and Patient  Education Provided     Education provided:   - condition, activity    Written Home Exercises Provided: Patient instructed to cont prior HEP.  Exercises were reviewed and Snow was able to demonstrate them prior to the end of the session.  Snow demonstrated good  understanding of the education provided.         Assessment   No  Restrictions along cervical vertebrae.  Pt. Reports pain R shoulder with PROM with 'popping'.  Pt. Reports discomfort with L hip flexion and adduction passively.  Pt. Reports pain R knee with passive extension.   Pt. Had no pain with B knee flexion passively. Pt. Cont. To have slight tension along L peroneal nerve.  Pt. Educated on need for overall strengthening to help increase stability of joints which will help decrease pain and crepitus.  Pt. Educated on need for cardiovascular training to improving mobility and oxygenation to tissues to desensitize nerves.    Snow is progressing towards her goals.   Pt prognosis is Excellent.     Pt will continue to benefit from skilled outpatient physical therapy to address the deficits listed in the problem list box on initial evaluation, provide pt/family education and to maximize pt's level of independence in the home and community environment.     Pt's spiritual, cultural and educational needs considered and pt agreeable to plan of care and goals.     Anticipated barriers to physical therapy: none    Goals:  Short Term Goals: In 3-4 weeks   1.I with HEP  2.Pt to perform lumbar ROM with pain less than 4/10 consistently.  3. Pt to increase BLE hip strength to 3+/5 or greater.  4.Pt to have knee pain less than 4/10 at all times.        Long Term Goals: In 6-8 weeks  1.Pt to have 5/5 gross UE strength to tolerate lifting and reaching without discomfort.  2. Patient to demo increase in LE strength to 5/5 gross MMT to tolerate standing and walking activities.  3. Patient to have decreased pain to less than 2/10 at all times.  4. Patient to have  neg. B sciatic nerve bias to tolerate sitting and standing activities.          Plan     Plan of care Certification: 12/24/2019 to 1/24/20.     Outpatient Physical Therapy 2 times weekly for 8 weeks to include the following interventions: Gait Training, Manual Therapy, Moist Heat/ Ice, Neuromuscular Re-ed, Patient Education, Self Care, Therapeutic Activites and Therapeutic Exercise, e-stim.       Thank you for this referral.     These services are reasonable and necessary for the conditions set forth above while under my care.    Flor Sow, PT

## 2020-01-09 ENCOUNTER — CLINICAL SUPPORT (OUTPATIENT)
Dept: REHABILITATION | Facility: HOSPITAL | Age: 50
End: 2020-01-09
Payer: COMMERCIAL

## 2020-01-09 DIAGNOSIS — R29.898 BILATERAL LEG WEAKNESS: Primary | ICD-10-CM

## 2020-01-09 PROCEDURE — 97014 ELECTRIC STIMULATION THERAPY: CPT

## 2020-01-09 PROCEDURE — 97140 MANUAL THERAPY 1/> REGIONS: CPT

## 2020-01-09 PROCEDURE — 97110 THERAPEUTIC EXERCISES: CPT

## 2020-01-09 NOTE — PROGRESS NOTES
Physical Therapy Daily Treatment Note     Name: Snow Matamoros  Clinic Number: [id number]    Therapy Diagnosis:   Encounter Diagnosis   Name Primary?    Bilateral leg weakness Yes     Physician: Chelsey Wisdom MD    Visit Date: 1/9/2020    Physician Orders: PT Eval and Treat  Medical Diagnosis: : M19.90 (ICD-10-CM) - Osteoarthritis, unspecified osteoarthritis type, unspecified site  Evaluation Date: 12/24/19  Authorization Period Expiration: 1/5/21  Plan of Care Certification Period: 1/26/20  Visit #/Visits authorized: 6/20     Time In: 8:00  Time Out: 9:15  Total Billable Time:  minutes    Precautions: Standard    Subjective   Pt. Reports having slight headaches R side head secondary stress.    Pt reports: She was compliant with home exercise program.  Response to previous treatment: none noted yet  Functional change: none noted yet    Neck Pain: 3-4/10 with headache  Low Back and L hip: 1-2/10      Objective   B glut medius strength 5/5 MMT  B hip adductors strength 1+/5 MMT    Snow received therapeutic exercises to develop strength, flexibility, posture and core stabilization for 29 minutes including: Nu Step x 7 min., planks x 2 min., hand heel rocks x 3 min., bridging x 2 min., B SL hip add x 4 min., B D1 flex x 4 min., B D2 flex 4 min.,  heel raises x 2min.    Snow received the following manual therapy techniques x 13 min.:  B LAD x 2 min., B tibia int and ext. Rot mob x 2 min., B tibiofemoral distraction mob x 4 min., L neural mobilization of peroneal nerve x 5 min.           Snow received the following supervised modalities after being cleared for contradictions: TENS:  Snow received TENS electrical stimulation for pain to the lumbar, L hip. Pt received continuous mode for 15 minutes. Snow tolerated treatment well without any adverse effects.      Snow received hot pack for 15 minutes to lumbar region.            Home Exercises Provided and Patient Education Provided      Education provided:   - condition, activity    Written Home Exercises Provided: Patient instructed to cont prior HEP.  Exercises were reviewed and Snow was able to demonstrate them prior to the end of the session.  Snow demonstrated good  understanding of the education provided.         Assessment   Pt.'s cervical AROM WNL passively without pain.  Pt. Noted to have tension in R cervical paraspinals with hypomobility at R AA joint, R C2-3 and R C3-4  Relieved with mobilizations.  Pt. Had no discomfort with B shoulder PROM.  Pt. Expressed no pain with B knee joint mobilizations or PROM.  Pt. Had discomfort/crepitus with R hip PROM.  Pt. Reports pain occurs with L LB, L hip, and L knee.  Pt. Reports still having symptoms in L lateral calf- with palpable mild tension noted along L peroneals. Pt.'s strength improved well with glut medius but still significantly weak with hip adductors.    Snow is progressing towards her goals.   Pt prognosis is Excellent.     Pt will continue to benefit from skilled outpatient physical therapy to address the deficits listed in the problem list box on initial evaluation, provide pt/family education and to maximize pt's level of independence in the home and community environment.     Pt's spiritual, cultural and educational needs considered and pt agreeable to plan of care and goals.     Anticipated barriers to physical therapy: none    Goals:  Short Term Goals: In 3-4 weeks   1.I with HEP  2.Pt to perform lumbar ROM with pain less than 4/10 consistently.  3. Pt to increase BLE hip strength to 3+/5 or greater.  4.Pt to have knee pain less than 4/10 at all times.        Long Term Goals: In 6-8 weeks  1.Pt to have 5/5 gross UE strength to tolerate lifting and reaching without discomfort.  2. Patient to demo increase in LE strength to 5/5 gross MMT to tolerate standing and walking activities.  3. Patient to have decreased pain to less than 2/10 at all times.  4. Patient to  have neg. B sciatic nerve bias to tolerate sitting and standing activities.          Plan     Plan of care Certification: 12/24/2019 to 1/24/20.     Outpatient Physical Therapy 2 times weekly for 8 weeks to include the following interventions: Gait Training, Manual Therapy, Moist Heat/ Ice, Neuromuscular Re-ed, Patient Education, Self Care, Therapeutic Activites and Therapeutic Exercise, e-stim.       Thank you for this referral.     These services are reasonable and necessary for the conditions set forth above while under my care.    Flor Sow, PT

## 2020-01-14 ENCOUNTER — CLINICAL SUPPORT (OUTPATIENT)
Dept: REHABILITATION | Facility: HOSPITAL | Age: 50
End: 2020-01-14
Payer: COMMERCIAL

## 2020-01-14 DIAGNOSIS — R29.898 BILATERAL LEG WEAKNESS: Primary | ICD-10-CM

## 2020-01-14 DIAGNOSIS — M15.9 PRIMARY OSTEOARTHRITIS INVOLVING MULTIPLE JOINTS: ICD-10-CM

## 2020-01-14 PROCEDURE — 97014 ELECTRIC STIMULATION THERAPY: CPT | Mod: CQ

## 2020-01-14 PROCEDURE — 97110 THERAPEUTIC EXERCISES: CPT | Mod: CQ

## 2020-01-14 NOTE — PROGRESS NOTES
Physical Therapy Assistant Daily Treatment Note     Name: Snow Matamoros  Clinic Number: [id number]    Therapy Diagnosis:   Encounter Diagnoses   Name Primary?    Bilateral leg weakness Yes    Primary osteoarthritis involving multiple joints      Physician: Chelsey Wisdom MD    Visit Date: 1/14/2020    Physician Orders: PT Eval and Treat  Medical Diagnosis: : M19.90 (ICD-10-CM) - Osteoarthritis, unspecified osteoarthritis type, unspecified site  Evaluation Date: 12/24/19  Authorization Period Expiration: 1/5/21  Plan of Care Certification Period: 1/24/20  Visit #/Visits authorized: 6/20     Time In: 8:00  Time Out: 9:15  Total Billable Time: 55 minutes    Precautions: Standard    Subjective   Pt. Reports feeling better; didn't feel stiff this morning when she woke up    Pt reports: She was compliant with home exercise program.  Response to previous treatment: pain relief  Functional change: less pain with activity    Pain: 3/10  Location: low back    Objective     Snow received therapeutic exercises to develop endurance, strength, flexibility, posture and core stabilization for 55 minutes including:  NuStep for strength and cardiovascular endurance  PPT  TvA iso  Bridges w/TB  LTR  Hook lying hip add/IR iso  Side lying clams RTB  Heel rocks  Sciatic nerve glides  Mini squats  Standing hip add w/ball  Standing 45 hip RTB    Snow received the following manual therapy techniques x 0 min.:  na today     Snow received the following supervised modalities after being cleared for contradictions: TENS:  Snow received TENS electrical stimulation for pain to lumbar region. Pt received continuous mode for 15 minutes. Snow tolerated treatment well without any adverse effects.      Snow received hot pack for 15 minutes to lumbar region with TENS      Home Exercises Provided and Patient Education Provided     Education provided:   - HEP review    Written Home Exercises Provided: Patient instructed to  cont prior HEP.  Exercises were reviewed and Snow was able to demonstrate them prior to the end of the session.  Snow demonstrated good  understanding of the education provided.       Assessment   Snow is progressing towards her goals. Snow presents today with mild back pain and reports no stiffness this am upon awakening. She continues to require strengthening in the glutes/core due to impairments however she is improving as evidenced with exercise progression. Her form with mini squats improved today with visual target.   Pt prognosis is Excellent.     Pt will continue to benefit from skilled outpatient physical therapy to address the deficits listed in the problem list box on initial evaluation, provide pt/family education and to maximize pt's level of independence in the home and community environment.     Pt's spiritual, cultural and educational needs considered and pt agreeable to plan of care and goals.     Anticipated barriers to physical therapy: none    Goals:  Short Term Goals: In 3-4 weeks   1.I with HEP  2.Pt to perform lumbar ROM with pain less than 4/10 consistently.  3. Pt to increase BLE hip strength to 3+/5 or greater.  4.Pt to have knee pain less than 4/10 at all times.        Long Term Goals: In 6-8 weeks  1.Pt to have 5/5 gross UE strength to tolerate lifting and reaching without discomfort.  2. Patient to demo increase in LE strength to 5/5 gross MMT to tolerate standing and walking activities.  3. Patient to have decreased pain to less than 2/10 at all times.  4. Patient to have neg. B sciatic nerve bias to tolerate sitting and standing activities.          Plan     Plan of care Certification: 12/24/2019 to 1/24/20.     Outpatient Physical Therapy 2 times weekly for 8 weeks to include the following interventions: Gait Training, Manual Therapy, Moist Heat/ Ice, Neuromuscular Re-ed, Patient Education, Self Care, Therapeutic Activites and Therapeutic Exercise,  e-stim.         Miley Talavera, SONAL

## 2020-01-16 ENCOUNTER — CLINICAL SUPPORT (OUTPATIENT)
Dept: REHABILITATION | Facility: HOSPITAL | Age: 50
End: 2020-01-16
Payer: COMMERCIAL

## 2020-01-16 DIAGNOSIS — R29.898 BILATERAL LEG WEAKNESS: Primary | ICD-10-CM

## 2020-01-16 PROCEDURE — 97140 MANUAL THERAPY 1/> REGIONS: CPT

## 2020-01-16 PROCEDURE — 97014 ELECTRIC STIMULATION THERAPY: CPT

## 2020-01-16 PROCEDURE — 97110 THERAPEUTIC EXERCISES: CPT

## 2020-01-16 NOTE — PROGRESS NOTES
Physical Therapy Daily Treatment Note     Name: Snow Matamoros  Clinic Number: [id number]    Therapy Diagnosis:   Encounter Diagnosis   Name Primary?    Bilateral leg weakness Yes     Physician: Chelsey Wisdom MD    Visit Date: 1/16/2020    Physician Orders: PT Eval and Treat  Medical Diagnosis: : M19.90 (ICD-10-CM) - Osteoarthritis, unspecified osteoarthritis type, unspecified site  Evaluation Date: 12/24/19  Authorization Period Expiration: 1/5/21  Plan of Care Certification Period: 1/26/20  Visit #/Visits authorized: 8/20     Time In: 8:00  Time Out: 9:15  Total Billable Time:  minutes    Precautions: Standard    Subjective   Pt. Reports that low back feels tight.    Pt reports: She was compliant with home exercise program.  Response to previous treatment: none noted yet  Functional change: none noted yet    Neck Pain: 1-2/10, feels stress  Low Back and L hip: 1-2/10      Objective     Snow received therapeutic exercises to develop strength, flexibility, posture and core stabilization for 25 minutes including: recumbent bike x 5 min., UBE x 4 min., planks x 2 min., hand heel rocks x 2 min., bridging x 2 min., heel raises x 2min., mini squats x 2 min., lunges x 2 min., hip abd machine x 2 min., hip add machine x 2 min.     Snow received the following manual therapy techniques x 15 min.:  R AA mobilization x 2 min., R C5-6 closing mob x 2 min., STM R cervical paraspinals x 5 min., B LAD x 2 min., B tibiofemoral distraction mob x 4 min.        Snow received the following supervised modalities after being cleared for contradictions: TENS:  Snow received TENS electrical stimulation for pain to the lumbar, L hip. Pt received continuous mode for 15 minutes. Snow tolerated treatment well without any adverse effects.      Snow received hot pack for 15 minutes to lumbar region.            Home Exercises Provided and Patient Education Provided     Education provided:   - condition,  activity    Written Home Exercises Provided: Patient instructed to cont prior HEP.  Exercises were reviewed and Snow was able to demonstrate them prior to the end of the session.  Snow demonstrated good  understanding of the education provided.         Assessment   Pt. Noted to have no pain with passive cervical ROM.  Pt. Reports tenderness R side cervical region with hypomobility noted at R AA joint and R C5-6 with palpable tension in R cervical paraspinals.  Pt. Had mild discomfort with L hip internal rotation.  Pt. Had no tenderness with palpation of lumbar spine.  Pt. Educated on need for spinal mobility exercises and observing  How her pillow at home positions her head and make sure it keeps her in a neutral position in supine or side lying.  Began using weight machines to increase hip strength.  Pt. Educated on returning to gym to begin preparing for discharge to ensure that her pain does not increase.    Snow is progressing towards her goals.   Pt prognosis is Excellent.     Pt will continue to benefit from skilled outpatient physical therapy to address the deficits listed in the problem list box on initial evaluation, provide pt/family education and to maximize pt's level of independence in the home and community environment.     Pt's spiritual, cultural and educational needs considered and pt agreeable to plan of care and goals.     Anticipated barriers to physical therapy: none    Goals:  Short Term Goals: In 3-4 weeks   1.I with HEP  2.Pt to perform lumbar ROM with pain less than 4/10 consistently.  3. Pt to increase BLE hip strength to 3+/5 or greater.  4.Pt to have knee pain less than 4/10 at all times.        Long Term Goals: In 6-8 weeks  1.Pt to have 5/5 gross UE strength to tolerate lifting and reaching without discomfort.  2. Patient to demo increase in LE strength to 5/5 gross MMT to tolerate standing and walking activities.  3. Patient to have decreased pain to less than 2/10 at all  times.  4. Patient to have neg. B sciatic nerve bias to tolerate sitting and standing activities.          Plan     Plan of care Certification: 12/24/2019 to 1/24/20.     Outpatient Physical Therapy 2 times weekly for 8 weeks to include the following interventions: Gait Training, Manual Therapy, Moist Heat/ Ice, Neuromuscular Re-ed, Patient Education, Self Care, Therapeutic Activites and Therapeutic Exercise, e-stim.       Thank you for this referral.     These services are reasonable and necessary for the conditions set forth above while under my care.    Flor Sow, PT

## 2020-01-21 ENCOUNTER — CLINICAL SUPPORT (OUTPATIENT)
Dept: REHABILITATION | Facility: HOSPITAL | Age: 50
End: 2020-01-21
Payer: COMMERCIAL

## 2020-01-21 DIAGNOSIS — R29.898 BILATERAL LEG WEAKNESS: Primary | ICD-10-CM

## 2020-01-21 PROCEDURE — 97140 MANUAL THERAPY 1/> REGIONS: CPT

## 2020-01-21 PROCEDURE — 97014 ELECTRIC STIMULATION THERAPY: CPT

## 2020-01-21 PROCEDURE — 97110 THERAPEUTIC EXERCISES: CPT

## 2020-01-21 NOTE — PROGRESS NOTES
Physical Therapy Daily Treatment Note     Name: Snow Matamoros  Clinic Number: [id number]    Therapy Diagnosis:   Encounter Diagnosis   Name Primary?    Bilateral leg weakness Yes     Physician: Chelsey Wisdom MD    Visit Date: 1/21/2020    Physician Orders: PT Eval and Treat  Medical Diagnosis: : M19.90 (ICD-10-CM) - Osteoarthritis, unspecified osteoarthritis type, unspecified site  Evaluation Date: 12/24/19  Authorization Period Expiration: 1/5/21  Plan of Care Certification Period: 1/26/20  Visit #/Visits authorized: 9/20     Time In: 8:00  Time Out: 9:30  Total Billable Time:  minutes    Precautions: Standard    Subjective   Pt. Reports that L low back hurts with stiffness and arthritis.  Pt. Reports still having pain R side cervical region.     Pt reports: She was compliant with home exercise program.  Response to previous treatment: none noted yet  Functional change: none noted yet    Neck Pain: 1-2/10, feels stress  Low Back: 2-3/10      Objective   Forward plank: 45 sec.    Snow received therapeutic exercises to develop strength, flexibility, posture and core stabilization for 29 minutes including:  elliptical x 5 min., planks x 2 min., hand heel rocks x 2 min., bridging x 2 min., heel raises x 2min., mini squats x 1 min., shuttle x 3 min., lunges x 2 min., quad machine x 2 min., HS curl machine x 2 min., hip abd machine x 2 min., hip add machine x 2 min., B side planks x 2 min.    Snow received the following manual therapy techniques x 13 min.:  R AA mobilization x 2 min.,  STM R cervical paraspinals x 5 min., L hip int. Rot mob x  2 min.., B tibiofemoral distraction mob x 4 min.        Snow received the following supervised modalities after being cleared for contradictions: TENS:  Snow received TENS electrical stimulation for pain to the lumbar, L hip. Pt received continuous mode for 15 minutes. Snow tolerated treatment well without any adverse effects.      Snow received hot  pack for 15 minutes to lumbar region.            Home Exercises Provided and Patient Education Provided     Education provided:   - condition, activity    Written Home Exercises Provided: Patient instructed to cont prior HEP.  Exercises were reviewed and Snow was able to demonstrate them prior to the end of the session.  Snow demonstrated good  understanding of the education provided.         Assessment   Pt. Reports mild pain in B superior knees after elliptical use.  Pt. Had mild tension in R AA joint.  Pt. Has mild tension R cervical paraspinals.  Pt. Had no pain in B shoulders today with PROM.  Pt.  Had no pain with B hips but noted to have limited L hip internal rotation.  Cont. With core and LE strengthening.  Hoping to get pt. More independent with weight machines to return to community gym and cont. Increasing strength and mobility.     Snow is progressing towards her goals.   Pt prognosis is Excellent.     Pt will continue to benefit from skilled outpatient physical therapy to address the deficits listed in the problem list box on initial evaluation, provide pt/family education and to maximize pt's level of independence in the home and community environment.     Pt's spiritual, cultural and educational needs considered and pt agreeable to plan of care and goals.     Anticipated barriers to physical therapy: none    Goals:  Short Term Goals: In 3-4 weeks   1.I with HEP  2.Pt to perform lumbar ROM with pain less than 4/10 consistently.  3. Pt to increase BLE hip strength to 3+/5 or greater.  4.Pt to have knee pain less than 4/10 at all times.        Long Term Goals: In 6-8 weeks  1.Pt to have 5/5 gross UE strength to tolerate lifting and reaching without discomfort.  2. Patient to demo increase in LE strength to 5/5 gross MMT to tolerate standing and walking activities.  3. Patient to have decreased pain to less than 2/10 at all times.  4. Patient to have neg. B sciatic nerve bias to tolerate  sitting and standing activities.          Plan     Plan of care Certification: 12/24/2019 to 1/24/20.     Outpatient Physical Therapy 2 times weekly for 8 weeks to include the following interventions: Gait Training, Manual Therapy, Moist Heat/ Ice, Neuromuscular Re-ed, Patient Education, Self Care, Therapeutic Activites and Therapeutic Exercise, e-stim.       Thank you for this referral.     These services are reasonable and necessary for the conditions set forth above while under my care.    Flor Sow, PT

## 2020-01-22 ENCOUNTER — TELEPHONE (OUTPATIENT)
Dept: RHEUMATOLOGY | Facility: CLINIC | Age: 50
End: 2020-01-22

## 2020-01-22 NOTE — TELEPHONE ENCOUNTER
----- Message from Benita Nicole sent at 1/22/2020  8:20 AM CST -----  Contact: Pt   Pt Is calling regarding  requesting om have nurse call to back. Pt states that call is in reference to scheduling an injection shot. .[phone] (home)           ..Thank You  Benita Nicole

## 2020-01-23 ENCOUNTER — CLINICAL SUPPORT (OUTPATIENT)
Dept: REHABILITATION | Facility: HOSPITAL | Age: 50
End: 2020-01-23
Payer: COMMERCIAL

## 2020-01-23 DIAGNOSIS — R29.898 BILATERAL LEG WEAKNESS: Primary | ICD-10-CM

## 2020-01-23 DIAGNOSIS — M15.9 PRIMARY OSTEOARTHRITIS INVOLVING MULTIPLE JOINTS: ICD-10-CM

## 2020-01-23 PROCEDURE — 97110 THERAPEUTIC EXERCISES: CPT

## 2020-01-23 PROCEDURE — 97014 ELECTRIC STIMULATION THERAPY: CPT

## 2020-01-23 PROCEDURE — 97140 MANUAL THERAPY 1/> REGIONS: CPT

## 2020-01-23 PROCEDURE — 97164 PT RE-EVAL EST PLAN CARE: CPT | Mod: 59

## 2020-01-23 NOTE — PROGRESS NOTES
Outpatient Therapy Updated Plan of Care     Visit Date: 1/23/2020    Name: Snow Matamoros  Clinic Number: [id number]    Therapy Diagnosis:   Encounter Diagnoses   Name Primary?    Bilateral leg weakness Yes    Primary osteoarthritis involving multiple joints      Physician: Chelsey Wisdom MD    Physician Orders: PT Eval and Treat   Medical Diagnosis from Referral: M19.90 (ICD-10-CM) - Osteoarthritis, unspecified osteoarthritis type, unspecified site  Evaluation Date: 1/23/2020  Current Certification Period:  1/24/20 to 2/24/20  Authorization Period Expiration: 12/31/19  Plan of Care Expiration: 1/24/19  Visit # / Visits authorized: 10/60    Precautions: Standard  Functional Level Prior to Evaluation:  walks slow with knee pain, getting out of car    Subjective     Update: Pt. Reports that her calf muscles are tight again.  Pt still reports pain in L lumbar paraspinals region.  Pt. Reports pain going down L LE and cramping in L foot one night.    Pain Level: 2-3/10  Objective     Update: Posture/Structure:  Mild trunk flexion     Cervical AROM : WNL but pain R cervical region     UE AROM: B Shoulders WNL     B shoulder strength: 4+/5 except 4/5 B ext. Rot, and 3+/5 B int. rotators     Gait:     Neuro/Sensation:      Reflexes:    Sensation: Decreased sensation L UE, B LE intact     Squat:   Eval               Double leg:       shift weight L, increased hip adduction on L                                                               Single leg:       NT      Re-Eval  Double leg squat: WNL except decrease knee flexion    SLS Eval- R:L 14: 17 sec.          Re-Eval- 17:6 sec. ( L back pain)     L/sp AROM: Eval    Degrees Pain Present (Y/N)   FB 90 pain   RSB WNL Less pain   LSB WNL Pain in L Low back   BB WNL pain   RRot WNL     LRot WNL         L/sp AROM: Re-Eval    Degrees Pain Present (Y/N)   FB 90 Pull in L low back   RSB WNL Pain with return to neutral   LSB WNL Pain in L Low back   BB WNL Pain pinch in L Low  back   RRot WNL     LRot WNL     Discomfort with L extension rotation                                                                                Eval    Re-Eval 1/23/20                                                                          R          L   R L  Strength:                    Hip flexors                   5/5       5/5   5/5 4/5  Quadriceps                  5/5       5/5         5/5 5/5                                      Hamstrings                  5/5       5/5   5/5 5/5                                      Anterior Tibialis           5/5       5/5   5/5 5/5                                      Peroneals                    5/5       5/5   5/5 5/5                                      Gastrocnemius            4/5       4/5   5/5 5/5                                      Adductors                    1+/5     1+/5   1+/5 3/5                                      External rotators         NT/5    NT/5   NT NT                                      Gluteus Medius           4+/5     4+/5              4+/5 4+/5                                      Gluteus Eliazar        1+/5     1+/5   NT NT                                      Great toe extension    NT/5    NT/5   NT NT                                      Plank time                   NTsec    45 sec.                                      Side plank time           NTsec    NT NT                                      Eufemia                     NTsec    NT                                      Post. Tibialis                5/5       3+/5   5/5 4/5        Eval  Special Test:  Slump Test:                 NT                                              SLR Test:                    Mild + bilat                                                    RE-Eval: Kirill Test- neg. Bilat.                          PIVM Lumbar: Eval- discomfort throughout lumbar spine      Re-Eval-  Tenderness L L5-S1      Thoracic Mobility: NT                            Palpation: no  tenderness in L thoracic paraspinals     Neural Tension Test:Eval -  mild + B sciatic nerve        CMS Impairment/Limitation/Restriction for FOTO Knee Survey    Therapist reviewed FOTO scores for Snow Matamoros on 1/23/20   FOTO documents entered into [employer] - see Media section.    Limitation Score: 54%  Category: Mobility    Current : CK = at least 40% but < 60% impaired, limited or restricted  Goal: CJ = at least 20% but < 40% impaired, limited or restricted  Discharge:            Assessment     Update: Pt. Progressing well and pain has decreased well overall.  Pt. Still has mild pain with L lumbar extension rotation indicative of possible facet impingement.  Pt. Cont. To need increased deep core strength and improved B LE and lumbar flexibility  And strength.  Pt. Also still has discomfort L lateral gastroc.      Previous Short Term Goals Status:   Met 3/4 STGs  New Short Term Goals Status:   NA  Long Term Goal Status:   Cont.  Goals:  Short Term Goals: In 3-4 weeks   1.I with HEP Met  2.Pt to perform lumbar ROM with pain less than 4/10 consistently. Met  3. Pt to increase BLE hip strength to 3+/5 or greater. Progressed well  4.Pt to have knee pain less than 4/10 at all times. Met        Long Term Goals: In 6-8 weeks  1.Pt to have 5/5 gross UE strength to tolerate lifting and reaching without discomfort. NA  2. Patient to demo increase in LE strength to 5/5 gross MMT to tolerate standing and walking activities. Not Met  3. Patient to have decreased pain to less than 2/10 at all times. Not Met  4. Patient to have neg. B sciatic nerve bias to tolerate sitting and standing activities. Not Met    Reasons for Recertification of Therapy:   Pt. Cont. To need skilled PT to further reduce lumbar pain with lumbar mobility and increase core strength and LE strength, decrease L peroneal nerve bias, increase lumbar and LE flexibility.    Plan     Updated Certification Period: 1/23/2020 to 2/23/20  Recommended Treatment Plan:  2 times per week for 4 weeks: Gait Training, Manual Therapy, Moist Heat/ Ice, Neuromuscular Re-ed, Patient Education, Self Care, Therapeutic Activites and Therapeutic Exercise, e-stim.  Other Recommendations: none    Flor Sow, PT  1/23/2020      I CERTIFY THE NEED FOR THESE SERVICES FURNISHED UNDER THIS PLAN OF TREATMENT AND WHILE UNDER MY CARE    Physician's comments:        Physician's Signature: ___________________________________________________

## 2020-01-23 NOTE — PROGRESS NOTES
Physical Therapy Daily Treatment Note     Name: Snow Matamoros  Clinic Number: [id number]    Therapy Diagnosis:   Encounter Diagnoses   Name Primary?    Bilateral leg weakness Yes    Primary osteoarthritis involving multiple joints      Physician: Chelsey Wisdom MD    Visit Date: 1/23/2020    Physician Orders: PT Eval and Treat  Medical Diagnosis: : M19.90 (ICD-10-CM) - Osteoarthritis, unspecified osteoarthritis type, unspecified site  Evaluation Date: 12/24/19  Authorization Period Expiration: 1/5/21  Plan of Care Certification Period: 1/26/20  Visit #/Visits authorized: 10/20     Time In: 8:10  Time Out: 9:30  Total Billable Time:  minutes    Precautions: Standard    Subjective   See RE-eval   Pt reports: She was compliant with home exercise program.  Response to previous treatment: none noted yet  Functional change: none noted yet    Pain  Level: 2-3/10    Objective   Re-Eval completed    Snow received therapeutic exercises to develop strength, flexibility, posture and core stabilization for 14 minutes including:  elliptical x 5 min., planks x 2 min., hand heel rocks x 2 min., bridging x 2 min.,  shuttle x 3 min.    Snow received the following manual therapy techniques x 18 min.:  L facet gapping x 2min., L lumbar gapping x 2min. L L5-S1 P-A mob x 2 min., neural mobilization to L peroneal nerve x 5 min.      Snow received the following supervised modalities after being cleared for contradictions: TENS:  Snow received TENS electrical stimulation for pain to the lumbar, L hip. Pt received continuous mode for 15 minutes. Snow tolerated treatment well without any adverse effects.      Snow received hot pack for 15 minutes to lumbar region.            Home Exercises Provided and Patient Education Provided     Education provided:   - condition, activity    Written Home Exercises Provided: Patient instructed to cont prior HEP.  Exercises were reviewed and Snow was able to demonstrate  them prior to the end of the session.  Snow demonstrated good  understanding of the education provided.         Assessment   Snow is progressing towards her goals. See RE-Eval  Pt prognosis is Excellent.     Pt will continue to benefit from skilled outpatient physical therapy to address the deficits listed in the problem list box on initial evaluation, provide pt/family education and to maximize pt's level of independence in the home and community environment.     Pt's spiritual, cultural and educational needs considered and pt agreeable to plan of care and goals.     Anticipated barriers to physical therapy: none    Goals:  Short Term Goals: In 3-4 weeks   1.I with HEP  2.Pt to perform lumbar ROM with pain less than 4/10 consistently.  3. Pt to increase BLE hip strength to 3+/5 or greater.  4.Pt to have knee pain less than 4/10 at all times.        Long Term Goals: In 6-8 weeks  1.Pt to have 5/5 gross UE strength to tolerate lifting and reaching without discomfort.  2. Patient to demo increase in LE strength to 5/5 gross MMT to tolerate standing and walking activities.  3. Patient to have decreased pain to less than 2/10 at all times.  4. Patient to have neg. B sciatic nerve bias to tolerate sitting and standing activities.          Plan     Plan of care Certification: 12/24/2019 to 1/24/20.     Outpatient Physical Therapy 2 times weekly for 8 weeks to include the following interventions: Gait Training, Manual Therapy, Moist Heat/ Ice, Neuromuscular Re-ed, Patient Education, Self Care, Therapeutic Activites and Therapeutic Exercise, e-stim.       Thank you for this referral.     These services are reasonable and necessary for the conditions set forth above while under my care.    Flor Sow, PT

## 2020-01-28 ENCOUNTER — CLINICAL SUPPORT (OUTPATIENT)
Dept: REHABILITATION | Facility: HOSPITAL | Age: 50
End: 2020-01-28
Payer: COMMERCIAL

## 2020-01-28 DIAGNOSIS — R29.898 BILATERAL LEG WEAKNESS: Primary | ICD-10-CM

## 2020-01-28 PROCEDURE — 97110 THERAPEUTIC EXERCISES: CPT

## 2020-01-28 PROCEDURE — 97140 MANUAL THERAPY 1/> REGIONS: CPT

## 2020-01-28 PROCEDURE — 97014 ELECTRIC STIMULATION THERAPY: CPT

## 2020-01-28 NOTE — PROGRESS NOTES
Physical Therapy Daily Treatment Note     Name: Snow Matamoros  Clinic Number: [id number]    Therapy Diagnosis:   Encounter Diagnosis   Name Primary?    Bilateral leg weakness Yes     Physician: Chelsey Wisdom MD    Visit Date: 1/28/2020    Physician Orders: PT Eval and Treat  Medical Diagnosis: : M19.90 (ICD-10-CM) - Osteoarthritis, unspecified osteoarthritis type, unspecified site  Evaluation Date: 12/24/19  Authorization Period Expiration: 1/5/21  Plan of Care Certification Period: 1/26/20  Visit #/Visits authorized: 11/20     Time In: 8:10  Time Out: 9:30  Total Billable Time:  minutes    Precautions: Standard    Subjective   Pt. Reports not having any new complaints today.  Pt. Reports pain in her knees and pain at L low back.    Pt reports: She was compliant with home exercise program.  Response to previous treatment: none noted yet  Functional change: none noted yet    B Knees Pain Level: 2-3/10  Low Back Pain Level: 2-3/10    Objective       Snow received therapeutic exercises to develop strength, flexibility, posture and core stabilization for 35 minutes including:  elliptical x 6 min., planks x 2 min., hand heel rocks x 2 min., bridging x 2 min.,  shuttle x 3 min., sciatic nerve glide x 2 min., B femoral nerve glide x 4 min., heel raises x 2 min., clams x 4 min., LE weight machine- quad, HS, hip abd, hip add x 8  Min.    Snow received the following manual therapy techniques x 18 min.:  L facet gapping x 2min., L lumbar gapping x 2min., L5 and L L5-S1 P-A mob x 4 min.     Snow received the following supervised modalities after being cleared for contradictions: TENS:  Snow received TENS electrical stimulation for pain to the lumbar, L hip. Pt received continuous mode for 15 minutes. Snow tolerated treatment well without any adverse effects.      Snow received hot pack for 15 minutes to lumbar region.            Home Exercises Provided and Patient Education Provided     Education  provided:   - condition, activity    Written Home Exercises Provided: Patient instructed to cont prior HEP.  Exercises were reviewed and Snow was able to demonstrate them prior to the end of the session.  Snow demonstrated good  understanding of the education provided.         Assessment   Snow is progressing towards her goals. Pt. Had no restrictions along cervical vertebrae or tension in cervical paraspinals.  Pt. Had mild pain with L hip with PROM but resolved after further PROM. Pt. Had no tenderness along lumbar spine with P-A mobilization but mild tension noted L erector spinae region.  Pt. Reports relief with L lumbar gapping.  Pt. Educated on cont. PT 1x/wk after next visit if no increase in pain to ensure no return of pain and that patient independent with increasing strength and mobility.  Pt prognosis is Excellent.     Pt will continue to benefit from skilled outpatient physical therapy to address the deficits listed in the problem list box on initial evaluation, provide pt/family education and to maximize pt's level of independence in the home and community environment.     Pt's spiritual, cultural and educational needs considered and pt agreeable to plan of care and goals.     Anticipated barriers to physical therapy: none    Goals:  Short Term Goals: In 3-4 weeks   1.I with HEP  2.Pt to perform lumbar ROM with pain less than 4/10 consistently.  3. Pt to increase BLE hip strength to 3+/5 or greater.  4.Pt to have knee pain less than 4/10 at all times.        Long Term Goals: In 6-8 weeks  1.Pt to have 5/5 gross UE strength to tolerate lifting and reaching without discomfort.  2. Patient to demo increase in LE strength to 5/5 gross MMT to tolerate standing and walking activities.  3. Patient to have decreased pain to less than 2/10 at all times.  4. Patient to have neg. B sciatic nerve bias to tolerate sitting and standing activities.          Plan     Plan of care Certification: 12/24/2019  to 1/24/20.     Outpatient Physical Therapy 2 times weekly for 8 weeks to include the following interventions: Gait Training, Manual Therapy, Moist Heat/ Ice, Neuromuscular Re-ed, Patient Education, Self Care, Therapeutic Activites and Therapeutic Exercise, e-stim.       Thank you for this referral.     These services are reasonable and necessary for the conditions set forth above while under my care.    Flor Sow, PT
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
## 2020-01-30 ENCOUNTER — CLINICAL SUPPORT (OUTPATIENT)
Dept: REHABILITATION | Facility: HOSPITAL | Age: 50
End: 2020-01-30
Payer: COMMERCIAL

## 2020-01-30 DIAGNOSIS — R29.898 BILATERAL LEG WEAKNESS: Primary | ICD-10-CM

## 2020-01-30 PROCEDURE — 97110 THERAPEUTIC EXERCISES: CPT

## 2020-01-30 PROCEDURE — 97014 ELECTRIC STIMULATION THERAPY: CPT

## 2020-01-30 PROCEDURE — 97140 MANUAL THERAPY 1/> REGIONS: CPT

## 2020-02-05 DIAGNOSIS — M19.90 OSTEOARTHRITIS, UNSPECIFIED OSTEOARTHRITIS TYPE, UNSPECIFIED SITE: Primary | ICD-10-CM

## 2020-02-14 RX ORDER — GABAPENTIN 300 MG/1
300 CAPSULE ORAL NIGHTLY
Qty: 30 CAPSULE | Refills: 1 | Status: SHIPPED | OUTPATIENT
Start: 2020-02-14 | End: 2021-02-13

## 2023-06-01 NOTE — PROGRESS NOTES
Physical Therapy Daily Treatment Note     Name: Snow Matamoros  Clinic Number: [id number]    Therapy Diagnosis:   Encounter Diagnosis   Name Primary?    Primary osteoarthritis involving multiple joints Yes     Physician: Chelsey Wisdom MD    Visit Date: 12/30/2019    Physician Orders: PT Eval and Treat  Medical Diagnosis: : M19.90 (ICD-10-CM) - Osteoarthritis, unspecified osteoarthritis type, unspecified site  Evaluation Date: 12/24/19  Authorization Period Expiration: 1/5/21  Plan of Care Certification Period: 1/26/20  Visit #/Visits authorized: 3/20     Time In: 4:00  Time Out: 5:15  Total Billable Time: 26 minutes    Precautions: Standard    Subjective   Pt. Reports that her L lower lateral leg is  to touch.  Pt. Reports that she has pain along L lumbar paraspinals which radiates into lower lumbar spine.  Pt. Reports mild pain in upper cervical region.      Pt reports: She was compliant with home exercise program.  Response to previous treatment: none noted yet  Functional change: none noted yet    Neck Pain: 6/10 with R SB   Low Back: 6/10      Objective   B hip ext. Rotators MMT 3/5   B hip int. Rotators MMT 4+/5    Snow received therapeutic exercises to develop strength, flexibility, posture and core stabilization for 12 minutes including: iso abs  x 2 min., bridging x 2 min., hip add x 2min., L sciatic nerve glide x 2min., B ext. Rot x 2 min., heel raises x 2 min., B femoral nerve glide x 4 min, mini squats x 2 min.      Snow received the following manual therapy techniques x 13 min.: P-A mob L5 spinous processes x 2 min., L peroneal neural mob x 5 min., L facet gapping x 2 min., L lumbar gapping x 2 min., B LAD x 2 min.          Snow received the following supervised modalities after being cleared for contradictions: TENS:  Snow received TENS electrical stimulation for pain to the lumbar. Pt received continuous mode for 15 minutes. Snow tolerated treatment well without  Appt moved up earlier. any adverse effects.      Snow received hot pack for 15 minutes to lumbar region.            Home Exercises Provided and Patient Education Provided     Education provided:   - condition, activity    Written Home Exercises Provided: Patient instructed to cont prior HEP.  Exercises were reviewed and Snow was able to demonstrate them prior to the end of the session.  Snow demonstrated good  understanding of the education provided.         Assessment   Pt. Not noted to have any pain with cervical PROM.  Pt. Had no restrictions along cervical vertebrae.  Pt. Educated on keeping chin retracted and shoulders retracted for good posture.  Pt. Had tenderness and hypomobility Left L 1-5 facets.  Pt.   No femoral or sciatic nerve bias noted except at L mid to proximal peroneal nerve.  Pt. Had mild pain with L hip flexion passively and R hip internal rotation.  Pt. Cont. To need skilled PT to increase strength of muscles to increase joint stability to decrease pain.    Snow is progressing towards her goals.   Pt prognosis is Excellent.     Pt will continue to benefit from skilled outpatient physical therapy to address the deficits listed in the problem list box on initial evaluation, provide pt/family education and to maximize pt's level of independence in the home and community environment.     Pt's spiritual, cultural and educational needs considered and pt agreeable to plan of care and goals.     Anticipated barriers to physical therapy: none    Goals:  Short Term Goals: In 3-4 weeks   1.I with HEP  2.Pt to perform lumbar ROM with pain less than 4/10 consistently.  3. Pt to increase BLE hip strength to 3+/5 or greater.  4.Pt to have knee pain less than 4/10 at all times.        Long Term Goals: In 6-8 weeks  1.Pt to have 5/5 gross UE strength to tolerate lifting and reaching without discomfort.  2. Patient to demo increase in LE strength to 5/5 gross MMT to tolerate standing and walking activities.  3. Patient  to have decreased pain to less than 2/10 at all times.  4. Patient to have neg. B sciatic nerve bias to tolerate sitting and standing activities.          Plan     Plan of care Certification: 12/24/2019 to 1/24/20.     Outpatient Physical Therapy 2 times weekly for 8 weeks to include the following interventions: Gait Training, Manual Therapy, Moist Heat/ Ice, Neuromuscular Re-ed, Patient Education, Self Care, Therapeutic Activites and Therapeutic Exercise, e-stim.       Thank you for this referral.     These services are reasonable and necessary for the conditions set forth above while under my care.    Flor Sow, PT